# Patient Record
Sex: MALE | Race: WHITE | NOT HISPANIC OR LATINO | URBAN - METROPOLITAN AREA
[De-identification: names, ages, dates, MRNs, and addresses within clinical notes are randomized per-mention and may not be internally consistent; named-entity substitution may affect disease eponyms.]

---

## 2021-07-08 ENCOUNTER — INPATIENT HOSPITAL (OUTPATIENT)
Dept: URBAN - METROPOLITAN AREA MEDICAL CENTER 11 | Facility: MEDICAL CENTER | Age: 56
End: 2021-07-08
Payer: COMMERCIAL

## 2021-07-08 DIAGNOSIS — K22.6 GASTRO-ESOPHAGEAL LACERATION-HEMORRHAGE SYNDROME: ICD-10-CM

## 2021-07-08 DIAGNOSIS — R74.8 ABNORMAL LEVELS OF OTHER SERUM ENZYMES: ICD-10-CM

## 2021-07-08 DIAGNOSIS — K92.0 HEMATEMESIS: ICD-10-CM

## 2021-07-08 DIAGNOSIS — D69.6 THROMBOCYTOPENIA, UNSPECIFIED: ICD-10-CM

## 2021-07-08 DIAGNOSIS — Z98.84 BARIATRIC SURGERY STATUS: ICD-10-CM

## 2021-07-08 DIAGNOSIS — D62 ACUTE POSTHEMORRHAGIC ANEMIA: ICD-10-CM

## 2021-07-08 DIAGNOSIS — K20.0 EOSINOPHILIC ESOPHAGITIS: ICD-10-CM

## 2021-07-08 PROCEDURE — 43235 EGD DIAGNOSTIC BRUSH WASH: CPT | Performed by: SPECIALIST

## 2021-07-08 PROCEDURE — 99222 1ST HOSP IP/OBS MODERATE 55: CPT | Mod: 25 | Performed by: SPECIALIST

## 2021-07-09 ENCOUNTER — INPATIENT HOSPITAL (OUTPATIENT)
Dept: URBAN - METROPOLITAN AREA MEDICAL CENTER 11 | Facility: MEDICAL CENTER | Age: 56
End: 2021-07-09
Payer: COMMERCIAL

## 2021-07-09 DIAGNOSIS — K70.10 ALCOHOLIC HEPATITIS WITHOUT ASCITES: ICD-10-CM

## 2021-07-09 DIAGNOSIS — Z98.84 BARIATRIC SURGERY STATUS: ICD-10-CM

## 2021-07-09 DIAGNOSIS — R74.8 ABNORMAL LEVELS OF OTHER SERUM ENZYMES: ICD-10-CM

## 2021-07-09 DIAGNOSIS — K20.0 EOSINOPHILIC ESOPHAGITIS: ICD-10-CM

## 2021-07-09 DIAGNOSIS — K22.6 GASTRO-ESOPHAGEAL LACERATION-HEMORRHAGE SYNDROME: ICD-10-CM

## 2021-07-09 DIAGNOSIS — D62 ACUTE POSTHEMORRHAGIC ANEMIA: ICD-10-CM

## 2021-07-09 DIAGNOSIS — K92.0 HEMATEMESIS: ICD-10-CM

## 2021-07-09 DIAGNOSIS — D69.6 THROMBOCYTOPENIA, UNSPECIFIED: ICD-10-CM

## 2021-07-09 PROCEDURE — 99232 SBSQ HOSP IP/OBS MODERATE 35: CPT | Performed by: INTERNAL MEDICINE

## 2021-07-10 ENCOUNTER — INPATIENT HOSPITAL (OUTPATIENT)
Dept: URBAN - METROPOLITAN AREA MEDICAL CENTER 11 | Facility: MEDICAL CENTER | Age: 56
End: 2021-07-10
Payer: COMMERCIAL

## 2021-07-10 DIAGNOSIS — K92.0 HEMATEMESIS: ICD-10-CM

## 2021-07-10 DIAGNOSIS — D62 ACUTE POSTHEMORRHAGIC ANEMIA: ICD-10-CM

## 2021-07-10 DIAGNOSIS — K22.6 GASTRO-ESOPHAGEAL LACERATION-HEMORRHAGE SYNDROME: ICD-10-CM

## 2021-07-10 DIAGNOSIS — R74.8 ABNORMAL LEVELS OF OTHER SERUM ENZYMES: ICD-10-CM

## 2021-07-10 DIAGNOSIS — Z98.84 BARIATRIC SURGERY STATUS: ICD-10-CM

## 2021-07-10 DIAGNOSIS — D61.818 OTHER PANCYTOPENIA: ICD-10-CM

## 2021-07-10 DIAGNOSIS — D69.6 THROMBOCYTOPENIA, UNSPECIFIED: ICD-10-CM

## 2021-07-10 DIAGNOSIS — K20.0 EOSINOPHILIC ESOPHAGITIS: ICD-10-CM

## 2021-07-10 DIAGNOSIS — K70.10 ALCOHOLIC HEPATITIS WITHOUT ASCITES: ICD-10-CM

## 2021-07-10 PROCEDURE — 99232 SBSQ HOSP IP/OBS MODERATE 35: CPT | Performed by: INTERNAL MEDICINE

## 2021-08-05 ENCOUNTER — INPATIENT HOSPITAL (OUTPATIENT)
Dept: URBAN - METROPOLITAN AREA MEDICAL CENTER 11 | Facility: MEDICAL CENTER | Age: 56
End: 2021-08-05
Payer: COMMERCIAL

## 2021-08-05 DIAGNOSIS — K92.0 HEMATEMESIS: ICD-10-CM

## 2021-08-05 DIAGNOSIS — K22.10 ULCER OF ESOPHAGUS WITHOUT BLEEDING: ICD-10-CM

## 2021-08-05 DIAGNOSIS — K44.9 DIAPHRAGMATIC HERNIA WITHOUT OBSTRUCTION OR GANGRENE: ICD-10-CM

## 2021-08-05 DIAGNOSIS — D64.9 ANEMIA, UNSPECIFIED: ICD-10-CM

## 2021-08-05 PROCEDURE — 43235 EGD DIAGNOSTIC BRUSH WASH: CPT | Performed by: INTERNAL MEDICINE

## 2022-01-23 ENCOUNTER — INPATIENT HOSPITAL (OUTPATIENT)
Dept: URBAN - METROPOLITAN AREA MEDICAL CENTER 11 | Facility: MEDICAL CENTER | Age: 57
End: 2022-01-23

## 2022-01-23 DIAGNOSIS — D64.9 ANEMIA, UNSPECIFIED: ICD-10-CM

## 2022-01-23 DIAGNOSIS — K92.0 HEMATEMESIS: ICD-10-CM

## 2022-01-23 PROCEDURE — 99222 1ST HOSP IP/OBS MODERATE 55: CPT | Performed by: SPECIALIST

## 2022-01-24 ENCOUNTER — INPATIENT HOSPITAL (OUTPATIENT)
Dept: URBAN - METROPOLITAN AREA MEDICAL CENTER 11 | Facility: MEDICAL CENTER | Age: 57
End: 2022-01-24

## 2022-01-24 DIAGNOSIS — Z87.19 PERSONAL HISTORY OF OTHER DISEASES OF THE DIGESTIVE SYSTEM: ICD-10-CM

## 2022-01-24 DIAGNOSIS — Z98.84 BARIATRIC SURGERY STATUS: ICD-10-CM

## 2022-01-24 DIAGNOSIS — K92.0 HEMATEMESIS: ICD-10-CM

## 2022-01-24 DIAGNOSIS — R74.01 ELEVATION OF LEVELS OF LIVER TRANSAMINASE LEVELS: ICD-10-CM

## 2022-01-24 DIAGNOSIS — D61.818 OTHER PANCYTOPENIA: ICD-10-CM

## 2022-01-24 DIAGNOSIS — F10.19 ALCOHOL ABUSE WITH UNSPECIFIED ALCOHOL-INDUCED DISORDER: ICD-10-CM

## 2022-01-24 PROCEDURE — 99232 SBSQ HOSP IP/OBS MODERATE 35: CPT | Performed by: SPECIALIST

## 2022-01-25 ENCOUNTER — INPATIENT HOSPITAL (OUTPATIENT)
Dept: URBAN - METROPOLITAN AREA MEDICAL CENTER 11 | Facility: MEDICAL CENTER | Age: 57
End: 2022-01-25

## 2022-01-25 DIAGNOSIS — F10.19 ALCOHOL ABUSE WITH UNSPECIFIED ALCOHOL-INDUCED DISORDER: ICD-10-CM

## 2022-01-25 DIAGNOSIS — K92.0 HEMATEMESIS: ICD-10-CM

## 2022-01-25 DIAGNOSIS — Z98.84 BARIATRIC SURGERY STATUS: ICD-10-CM

## 2022-01-25 DIAGNOSIS — D61.818 OTHER PANCYTOPENIA: ICD-10-CM

## 2022-01-25 DIAGNOSIS — R74.01 ELEVATION OF LEVELS OF LIVER TRANSAMINASE LEVELS: ICD-10-CM

## 2022-01-25 DIAGNOSIS — Z87.19 PERSONAL HISTORY OF OTHER DISEASES OF THE DIGESTIVE SYSTEM: ICD-10-CM

## 2022-01-25 PROCEDURE — 99232 SBSQ HOSP IP/OBS MODERATE 35: CPT | Performed by: INTERNAL MEDICINE

## 2022-02-14 ENCOUNTER — OFFICE (OUTPATIENT)
Dept: URBAN - METROPOLITAN AREA CLINIC 67 | Facility: CLINIC | Age: 57
End: 2022-02-14

## 2022-02-14 VITALS
WEIGHT: 205 LBS | SYSTOLIC BLOOD PRESSURE: 122 MMHG | TEMPERATURE: 97.3 F | DIASTOLIC BLOOD PRESSURE: 68 MMHG | HEART RATE: 110 BPM | OXYGEN SATURATION: 98 % | HEIGHT: 72 IN

## 2022-02-14 DIAGNOSIS — K76.6 PORTAL HYPERTENSION: ICD-10-CM

## 2022-02-14 DIAGNOSIS — F10.20 ALCOHOL DEPENDENCE, UNCOMPLICATED: ICD-10-CM

## 2022-02-14 DIAGNOSIS — D50.0 IRON DEFICIENCY ANEMIA SECONDARY TO BLOOD LOSS (CHRONIC): ICD-10-CM

## 2022-02-14 DIAGNOSIS — Z12.11 ENCOUNTER FOR SCREENING FOR MALIGNANT NEOPLASM OF COLON: ICD-10-CM

## 2022-02-14 PROCEDURE — 99214 OFFICE O/P EST MOD 30 MIN: CPT | Performed by: INTERNAL MEDICINE

## 2022-02-14 RX ORDER — SODIUM SULFATE, MAGNESIUM SULFATE, AND POTASSIUM CHLORIDE 17.75; 2.7; 2.25 G/1; G/1; G/1
TABLET ORAL
Qty: 1 | Refills: 0 | Status: COMPLETED
Start: 2022-02-14 | End: 2022-06-28

## 2022-02-14 NOTE — SERVICENOTES
I applauded his efforts at alcohol cessation - he has not had any further episodes of coffee-ground emesis since his discharge from [facility]. I will check the above blood work and schedule him for a repeat screening colonoscopy with Dr. Do - we will plan for a FibroScan at the same time to assess for advanced fibrosis/cirrhosis.

## 2022-02-14 NOTE — SERVICEHPINOTES
The patient is seen today for follow-up after a recent hospitalization. He presented to the [facility] on 11/16/21 due to left upper quadrant abdominal pain and nausea. Labs: CMP-potassium 3.2, magnesium 1.5, , ALT 63, normal lipase, CBC-WBC 3.9, RBC 3.74, Hgb 10.9, HCT 34.3 with normal differential. Plasma serum alcohol 142 (H). normal chest x-ray. CT abdomen and pelvis with contrast revealed postsurgical changes. Diverticulosis without diverticulitis. Fatty infiltration of the liver. Magnesium was given in the ER and patient was discharged on potassium and pantoprazole.Today, he is seen for follow-up after a recent hospitalization - he was hospitalized at [facility] last month secondary to alcohol withdrawal and coffee ground emesis. 
br He reports that since his discharge, he has not had any further episodes of coffee-ground emesis and he denies any abdominal pain. He has been abstinent from further consumption of alcohol. His CBC done on 1/27/22 demonstrated a wbc count 3.4, Hgb 10.6 and plt count 65. Qcstoijft47/16/21- contrasted CT abdomen and pelvis- revealed postsurgical changes. Diverticulosis without diverticulitis. Fatty infiltration of the liver. br7/9/21 abdominal ultrasound-fatty liver. Normal spleen and no ascitesProcedures:br8/5/21 EGD (me) while hospitalized at [facility] secondary to hematemesis-revealed changes of a nonbleeding erosive esophagitis at the GE junction. Small hiatal hernia versus previous lap band placement. Suspect changes of mild portal hypertensive gastropathy within the gastric body. Normal duodenum. No fresh or old blood seen in the upper GI tract or evidence of varices.br7/18/21 EGD with Dr. Melissa-in the hospital secondary to hematemesis-revealed probable eosinophilic esophagitis. Helen-Wick tear ×2. No evidence of gastric bypass, but question of partial fundoplication or laparoscopic band. Old blood in the stomach and duodenum.br6/22/09 colonoscopy with Dr. Do was normal - no known family history of CRC or polypsbr

## 2022-03-31 ENCOUNTER — INPATIENT HOSPITAL (OUTPATIENT)
Dept: URBAN - METROPOLITAN AREA MEDICAL CENTER 11 | Facility: MEDICAL CENTER | Age: 57
End: 2022-03-31
Payer: COMMERCIAL

## 2022-03-31 DIAGNOSIS — K92.0 HEMATEMESIS: ICD-10-CM

## 2022-03-31 DIAGNOSIS — K20.90 ESOPHAGITIS, UNSPECIFIED WITHOUT BLEEDING: ICD-10-CM

## 2022-03-31 PROCEDURE — 99232 SBSQ HOSP IP/OBS MODERATE 35: CPT | Performed by: SPECIALIST

## 2022-06-28 ENCOUNTER — OFFICE (OUTPATIENT)
Dept: URBAN - METROPOLITAN AREA CLINIC 67 | Facility: CLINIC | Age: 57
End: 2022-06-28

## 2022-06-28 VITALS
SYSTOLIC BLOOD PRESSURE: 150 MMHG | DIASTOLIC BLOOD PRESSURE: 86 MMHG | HEIGHT: 72 IN | HEART RATE: 90 BPM | WEIGHT: 197 LBS

## 2022-06-28 DIAGNOSIS — Z12.11 ENCOUNTER FOR SCREENING FOR MALIGNANT NEOPLASM OF COLON: ICD-10-CM

## 2022-06-28 DIAGNOSIS — F10.20 ALCOHOL DEPENDENCE, UNCOMPLICATED: ICD-10-CM

## 2022-06-28 DIAGNOSIS — R18.8 OTHER ASCITES: ICD-10-CM

## 2022-06-28 DIAGNOSIS — K70.30 ALCOHOLIC CIRRHOSIS OF LIVER WITHOUT ASCITES: ICD-10-CM

## 2022-06-28 DIAGNOSIS — D69.6 THROMBOCYTOPENIA, UNSPECIFIED: ICD-10-CM

## 2022-06-28 PROCEDURE — 99214 OFFICE O/P EST MOD 30 MIN: CPT

## 2022-06-28 RX ORDER — RIFAXIMIN 550 MG/1
1100 TABLET ORAL
Qty: 60 | Refills: 5 | Status: ACTIVE
Start: 2022-06-28

## 2022-07-12 ENCOUNTER — OFFICE (OUTPATIENT)
Dept: URBAN - METROPOLITAN AREA CLINIC 67 | Facility: CLINIC | Age: 57
End: 2022-07-12
Payer: COMMERCIAL

## 2022-07-12 DIAGNOSIS — K76.0 FATTY (CHANGE OF) LIVER, NOT ELSEWHERE CLASSIFIED: ICD-10-CM

## 2022-07-12 PROCEDURE — 91200 LIVER ELASTOGRAPHY: CPT

## 2022-07-20 ENCOUNTER — INPATIENT HOSPITAL (OUTPATIENT)
Dept: URBAN - METROPOLITAN AREA MEDICAL CENTER 11 | Facility: MEDICAL CENTER | Age: 57
End: 2022-07-20
Payer: COMMERCIAL

## 2022-07-20 DIAGNOSIS — K82.8 OTHER SPECIFIED DISEASES OF GALLBLADDER: ICD-10-CM

## 2022-07-20 DIAGNOSIS — K70.11 ALCOHOLIC HEPATITIS WITH ASCITES: ICD-10-CM

## 2022-07-20 DIAGNOSIS — K70.30 ALCOHOLIC CIRRHOSIS OF LIVER WITHOUT ASCITES: ICD-10-CM

## 2022-07-20 DIAGNOSIS — R18.8 OTHER ASCITES: ICD-10-CM

## 2022-07-20 PROCEDURE — 99221 1ST HOSP IP/OBS SF/LOW 40: CPT | Performed by: INTERNAL MEDICINE

## 2022-08-12 ENCOUNTER — AMBULATORY SURGICAL CENTER (OUTPATIENT)
Dept: URBAN - METROPOLITAN AREA SURGERY 19 | Facility: SURGERY | Age: 57
End: 2022-08-12
Payer: MEDICARE

## 2022-08-12 DIAGNOSIS — K64.8 OTHER HEMORRHOIDS: ICD-10-CM

## 2022-08-12 DIAGNOSIS — K57.30 DIVERTICULOSIS OF LARGE INTESTINE WITHOUT PERFORATION OR ABS: ICD-10-CM

## 2022-08-12 DIAGNOSIS — Z12.11 ENCOUNTER FOR SCREENING FOR MALIGNANT NEOPLASM OF COLON: ICD-10-CM

## 2022-08-12 LAB
COLONOSCOPY STUDY: (no result)
COLONOSCOPY STUDY: (no result)

## 2022-08-12 PROCEDURE — G0121 COLON CA SCRN NOT HI RSK IND: HCPCS | Performed by: SPECIALIST

## 2022-09-06 ENCOUNTER — OFFICE (OUTPATIENT)
Dept: URBAN - METROPOLITAN AREA CLINIC 67 | Facility: CLINIC | Age: 57
End: 2022-09-06

## 2022-09-06 VITALS
HEIGHT: 72 IN | HEART RATE: 98 BPM | OXYGEN SATURATION: 97 % | DIASTOLIC BLOOD PRESSURE: 70 MMHG | SYSTOLIC BLOOD PRESSURE: 112 MMHG | WEIGHT: 185 LBS

## 2022-09-06 DIAGNOSIS — K70.31 ALCOHOLIC CIRRHOSIS OF LIVER WITH ASCITES: ICD-10-CM

## 2022-09-06 DIAGNOSIS — K72.90 HEPATIC FAILURE, UNSPECIFIED WITHOUT COMA: ICD-10-CM

## 2022-09-06 DIAGNOSIS — F10.20 ALCOHOL DEPENDENCE, UNCOMPLICATED: ICD-10-CM

## 2022-09-06 PROCEDURE — 99214 OFFICE O/P EST MOD 30 MIN: CPT

## 2022-09-06 RX ORDER — SPIRONOLACTONE 100 MG/1
TABLET ORAL
Qty: 138 | Refills: 0 | Status: ACTIVE

## 2022-09-06 RX ORDER — FUROSEMIDE 40 MG/1
80 TABLET ORAL
Qty: 30 | Refills: 5 | Status: ACTIVE

## 2022-09-06 RX ORDER — LACTULOSE 10 G/15ML
SOLUTION ORAL; RECTAL
Qty: 1800 | Refills: 10 | Status: ACTIVE
Start: 2022-09-06
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
## 2022-10-06 ENCOUNTER — OFFICE (OUTPATIENT)
Dept: URBAN - METROPOLITAN AREA CLINIC 67 | Facility: CLINIC | Age: 57
End: 2022-10-06

## 2022-10-06 VITALS
WEIGHT: 191 LBS | HEART RATE: 70 BPM | HEIGHT: 72 IN | DIASTOLIC BLOOD PRESSURE: 66 MMHG | SYSTOLIC BLOOD PRESSURE: 108 MMHG

## 2022-10-06 DIAGNOSIS — K72.90 HEPATIC FAILURE, UNSPECIFIED WITHOUT COMA: ICD-10-CM

## 2022-10-06 DIAGNOSIS — K70.31 ALCOHOLIC CIRRHOSIS OF LIVER WITH ASCITES: ICD-10-CM

## 2022-10-06 DIAGNOSIS — K76.6 PORTAL HYPERTENSION: ICD-10-CM

## 2022-10-06 PROCEDURE — 99214 OFFICE O/P EST MOD 30 MIN: CPT

## 2022-10-06 RX ORDER — SPIRONOLACTONE 100 MG/1
TABLET ORAL
Qty: 138 | Refills: 0 | Status: ACTIVE

## 2022-10-06 RX ORDER — POTASSIUM CHLORIDE 20 MEQ/1
20 TABLET, EXTENDED RELEASE ORAL
Refills: 0 | Status: COMPLETED
End: 2022-10-06

## 2022-10-06 NOTE — SERVICENOTES
1. Continue xifaxan twice a day
2. Continue low sodium diet, under 2,000mg
3. increase spironolactone to 100mg (okay to take 2- 50mg tablets)
4. STOP potassium
5. Continue furosemide 40mg daily. 
6. Continue lactulose twice a day. 
7. Continue 2 stool softeners daily. 
8. Start psyllium husk(fiber) daily. 1 time a day. 
9. Referral to [facility], Dr Rdz. 
10. Continue with paracentesis every 2 weeks.
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
## 2022-12-06 ENCOUNTER — OFFICE (OUTPATIENT)
Dept: URBAN - METROPOLITAN AREA CLINIC 67 | Facility: CLINIC | Age: 57
End: 2022-12-06

## 2022-12-06 VITALS
SYSTOLIC BLOOD PRESSURE: 100 MMHG | HEART RATE: 60 BPM | WEIGHT: 174 LBS | OXYGEN SATURATION: 99 % | HEIGHT: 72 IN | DIASTOLIC BLOOD PRESSURE: 62 MMHG

## 2022-12-06 DIAGNOSIS — K72.90 HEPATIC FAILURE, UNSPECIFIED WITHOUT COMA: ICD-10-CM

## 2022-12-06 DIAGNOSIS — K70.31 ALCOHOLIC CIRRHOSIS OF LIVER WITH ASCITES: ICD-10-CM

## 2022-12-06 DIAGNOSIS — F10.20 ALCOHOL DEPENDENCE, UNCOMPLICATED: ICD-10-CM

## 2022-12-06 PROCEDURE — 99214 OFFICE O/P EST MOD 30 MIN: CPT
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
## 2022-12-06 NOTE — SERVICENOTES
keep cardioligy appt
Continue current meds
Continue paracentesis every 10 dayss
Labs today to look at kidney function and potassium, will adjust dose then.

## 2023-11-14 ENCOUNTER — OFFICE (OUTPATIENT)
Dept: URBAN - METROPOLITAN AREA CLINIC 67 | Facility: CLINIC | Age: 58
End: 2023-11-14

## 2023-11-14 VITALS
SYSTOLIC BLOOD PRESSURE: 132 MMHG | OXYGEN SATURATION: 96 % | HEART RATE: 87 BPM | DIASTOLIC BLOOD PRESSURE: 80 MMHG | WEIGHT: 186 LBS | HEIGHT: 72 IN

## 2023-11-14 DIAGNOSIS — F10.20 ALCOHOL DEPENDENCE, UNCOMPLICATED: ICD-10-CM

## 2023-11-14 DIAGNOSIS — R18.8 OTHER ASCITES: ICD-10-CM

## 2023-11-14 DIAGNOSIS — K76.6 PORTAL HYPERTENSION: ICD-10-CM

## 2023-11-14 DIAGNOSIS — K70.30 ALCOHOLIC CIRRHOSIS OF LIVER WITHOUT ASCITES: ICD-10-CM

## 2023-11-14 PROCEDURE — 99204 OFFICE O/P NEW MOD 45 MIN: CPT | Performed by: SPECIALIST

## 2023-12-12 ENCOUNTER — OFFICE (OUTPATIENT)
Dept: URBAN - METROPOLITAN AREA CLINIC 67 | Facility: CLINIC | Age: 58
End: 2023-12-12

## 2023-12-12 VITALS
WEIGHT: 173 LBS | HEART RATE: 92 BPM | DIASTOLIC BLOOD PRESSURE: 70 MMHG | SYSTOLIC BLOOD PRESSURE: 118 MMHG | HEIGHT: 72 IN

## 2023-12-12 DIAGNOSIS — K72.90 HEPATIC FAILURE, UNSPECIFIED WITHOUT COMA: ICD-10-CM

## 2023-12-12 DIAGNOSIS — F10.20 ALCOHOL DEPENDENCE, UNCOMPLICATED: ICD-10-CM

## 2023-12-12 DIAGNOSIS — K70.30 ALCOHOLIC CIRRHOSIS OF LIVER WITHOUT ASCITES: ICD-10-CM

## 2023-12-12 DIAGNOSIS — D50.0 IRON DEFICIENCY ANEMIA SECONDARY TO BLOOD LOSS (CHRONIC): ICD-10-CM

## 2023-12-12 DIAGNOSIS — R18.8 OTHER ASCITES: ICD-10-CM

## 2023-12-12 PROCEDURE — 99214 OFFICE O/P EST MOD 30 MIN: CPT

## 2023-12-12 RX ORDER — SPIRONOLACTONE 25 MG/1
TABLET, FILM COATED ORAL
Qty: 30 | Refills: 2 | Status: COMPLETED
End: 2024-08-15

## 2023-12-12 RX ORDER — FUROSEMIDE 20 MG/1
20 TABLET ORAL
Qty: 30 | Refills: 2 | Status: COMPLETED
Start: 2023-12-12 | End: 2024-08-15

## 2023-12-12 NOTE — SERVICENOTES
1. Okay to continue Xifaxan but confirm preference with [facility] tomorrow
2. Start iron 65mg daily (optional Vitamin C supplement)
3. Continue pantoprazole for stomach acid/reflux
4. Start sucralfate twice a day, morning and night to help with the nausea and vomiting with eating too fast. Okay to take sucralfate up to 4 times a day, before meals. 
5. Continue ETOH cessation
6. Watch blood pressure and heart rate at home. If dizziness occurs, BP drops below 90/60 or heart rate goes over 100, please call the office. 
Taking the diuretics (spironolactone and furosemide) will help with keeping fluid off of your abdomen but can cause LOW blood pressure or increased heart rate or dizziness. 
7. Follow up 4-6 weeks
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
## 2024-01-18 ENCOUNTER — OFFICE (OUTPATIENT)
Dept: URBAN - METROPOLITAN AREA CLINIC 67 | Facility: CLINIC | Age: 59
End: 2024-01-18

## 2024-01-18 VITALS — WEIGHT: 194 LBS | DIASTOLIC BLOOD PRESSURE: 70 MMHG | HEIGHT: 72 IN | SYSTOLIC BLOOD PRESSURE: 110 MMHG

## 2024-01-18 DIAGNOSIS — K70.30 ALCOHOLIC CIRRHOSIS OF LIVER WITHOUT ASCITES: ICD-10-CM

## 2024-01-18 DIAGNOSIS — K72.90 HEPATIC FAILURE, UNSPECIFIED WITHOUT COMA: ICD-10-CM

## 2024-01-18 DIAGNOSIS — L29.9 PRURITUS, UNSPECIFIED: ICD-10-CM

## 2024-01-18 DIAGNOSIS — K76.6 PORTAL HYPERTENSION: ICD-10-CM

## 2024-01-18 DIAGNOSIS — R18.8 OTHER ASCITES: ICD-10-CM

## 2024-01-18 DIAGNOSIS — F10.20 ALCOHOL DEPENDENCE, UNCOMPLICATED: ICD-10-CM

## 2024-01-18 PROCEDURE — 99214 OFFICE O/P EST MOD 30 MIN: CPT

## 2024-01-18 RX ORDER — PANTOPRAZOLE SODIUM 40 MG/1
40 TABLET, DELAYED RELEASE ORAL
Qty: 90 | Refills: 3 | Status: COMPLETED
Start: 2024-01-18 | End: 2024-08-15

## 2024-01-18 RX ORDER — MECLIZINE HYDROCHLORIDE 12.5 MG/1
TABLET ORAL
Qty: 90 | Refills: 1 | Status: ACTIVE
Start: 2024-01-18

## 2024-08-15 ENCOUNTER — OFFICE (OUTPATIENT)
Dept: URBAN - METROPOLITAN AREA CLINIC 67 | Facility: CLINIC | Age: 59
End: 2024-08-15
Payer: MEDICARE

## 2024-08-15 VITALS
DIASTOLIC BLOOD PRESSURE: 80 MMHG | WEIGHT: 237 LBS | HEIGHT: 72 IN | HEART RATE: 92 BPM | SYSTOLIC BLOOD PRESSURE: 122 MMHG | OXYGEN SATURATION: 99 %

## 2024-08-15 DIAGNOSIS — K21.00 GASTRO-ESOPHAGEAL REFLUX DISEASE WITH ESOPHAGITIS, WITHOUT B: ICD-10-CM

## 2024-08-15 DIAGNOSIS — G47.9 SLEEP DISORDER, UNSPECIFIED: ICD-10-CM

## 2024-08-15 DIAGNOSIS — N62 HYPERTROPHY OF BREAST: ICD-10-CM

## 2024-08-15 DIAGNOSIS — R18.8 OTHER ASCITES: ICD-10-CM

## 2024-08-15 DIAGNOSIS — F10.20 ALCOHOL DEPENDENCE, UNCOMPLICATED: ICD-10-CM

## 2024-08-15 DIAGNOSIS — K70.30 ALCOHOLIC CIRRHOSIS OF LIVER WITHOUT ASCITES: ICD-10-CM

## 2024-08-15 DIAGNOSIS — K72.90 HEPATIC FAILURE, UNSPECIFIED WITHOUT COMA: ICD-10-CM

## 2024-08-15 DIAGNOSIS — L29.9 PRURITUS, UNSPECIFIED: ICD-10-CM

## 2024-08-15 DIAGNOSIS — K76.6 PORTAL HYPERTENSION: ICD-10-CM

## 2024-08-15 PROCEDURE — 99214 OFFICE O/P EST MOD 30 MIN: CPT

## 2024-08-15 RX ORDER — FUROSEMIDE 20 MG/1
20 TABLET ORAL
Qty: 30 | Refills: 2 | Status: COMPLETED
Start: 2023-12-12 | End: 2024-08-15

## 2024-08-15 RX ORDER — MECLIZINE HYDROCHLORIDE 12.5 MG/1
TABLET ORAL
Qty: 90 | Refills: 1 | Status: ACTIVE
Start: 2024-01-18

## 2024-11-30 ENCOUNTER — OFFICE (OUTPATIENT)
Dept: URBAN - METROPOLITAN AREA CLINIC 67 | Facility: CLINIC | Age: 59
End: 2024-11-30
Payer: MEDICARE

## 2024-11-30 DIAGNOSIS — K72.90 HEPATIC FAILURE, UNSPECIFIED WITHOUT COMA: ICD-10-CM

## 2024-11-30 DIAGNOSIS — K70.30 ALCOHOLIC CIRRHOSIS OF LIVER WITHOUT ASCITES: ICD-10-CM

## 2024-11-30 DIAGNOSIS — K76.6 PORTAL HYPERTENSION: ICD-10-CM

## 2024-11-30 DIAGNOSIS — F10.20 ALCOHOL DEPENDENCE, UNCOMPLICATED: ICD-10-CM

## 2024-11-30 PROCEDURE — 99490 CHRNC CARE MGMT STAFF 1ST 20: CPT

## 2024-11-30 PROCEDURE — 99439 CHRNC CARE MGMT STAF EA ADDL: CPT

## 2024-12-31 ENCOUNTER — OFFICE (OUTPATIENT)
Dept: URBAN - METROPOLITAN AREA CLINIC 67 | Facility: CLINIC | Age: 59
End: 2024-12-31
Payer: MEDICARE

## 2024-12-31 DIAGNOSIS — K72.90 HEPATIC FAILURE, UNSPECIFIED WITHOUT COMA: ICD-10-CM

## 2024-12-31 DIAGNOSIS — K76.6 PORTAL HYPERTENSION: ICD-10-CM

## 2024-12-31 DIAGNOSIS — F10.20 ALCOHOL DEPENDENCE, UNCOMPLICATED: ICD-10-CM

## 2024-12-31 DIAGNOSIS — K70.30 ALCOHOLIC CIRRHOSIS OF LIVER WITHOUT ASCITES: ICD-10-CM

## 2024-12-31 PROCEDURE — 99490 CHRNC CARE MGMT STAFF 1ST 20: CPT

## 2025-02-28 ENCOUNTER — OFFICE (OUTPATIENT)
Dept: URBAN - METROPOLITAN AREA CLINIC 67 | Facility: CLINIC | Age: 60
End: 2025-02-28
Payer: MEDICARE

## 2025-02-28 DIAGNOSIS — K72.90 HEPATIC FAILURE, UNSPECIFIED WITHOUT COMA: ICD-10-CM

## 2025-02-28 DIAGNOSIS — K76.6 PORTAL HYPERTENSION: ICD-10-CM

## 2025-02-28 DIAGNOSIS — F10.20 ALCOHOL DEPENDENCE, UNCOMPLICATED: ICD-10-CM

## 2025-02-28 DIAGNOSIS — K70.30 ALCOHOLIC CIRRHOSIS OF LIVER WITHOUT ASCITES: ICD-10-CM

## 2025-02-28 PROCEDURE — 99490 CHRNC CARE MGMT STAFF 1ST 20: CPT

## 2025-03-31 ENCOUNTER — OFFICE (OUTPATIENT)
Dept: URBAN - METROPOLITAN AREA CLINIC 67 | Facility: CLINIC | Age: 60
End: 2025-03-31
Payer: MEDICARE

## 2025-03-31 DIAGNOSIS — K70.30 ALCOHOLIC CIRRHOSIS OF LIVER WITHOUT ASCITES: ICD-10-CM

## 2025-03-31 DIAGNOSIS — K76.6 PORTAL HYPERTENSION: ICD-10-CM

## 2025-03-31 DIAGNOSIS — K72.90 HEPATIC FAILURE, UNSPECIFIED WITHOUT COMA: ICD-10-CM

## 2025-03-31 DIAGNOSIS — F10.20 ALCOHOL DEPENDENCE, UNCOMPLICATED: ICD-10-CM

## 2025-03-31 PROCEDURE — 99490 CHRNC CARE MGMT STAFF 1ST 20: CPT

## 2025-05-31 ENCOUNTER — OFFICE (OUTPATIENT)
Dept: URBAN - METROPOLITAN AREA CLINIC 67 | Facility: CLINIC | Age: 60
End: 2025-05-31
Payer: MEDICARE

## 2025-05-31 DIAGNOSIS — K72.90 HEPATIC FAILURE, UNSPECIFIED WITHOUT COMA: ICD-10-CM

## 2025-05-31 DIAGNOSIS — F10.20 ALCOHOL DEPENDENCE, UNCOMPLICATED: ICD-10-CM

## 2025-05-31 DIAGNOSIS — K70.30 ALCOHOLIC CIRRHOSIS OF LIVER WITHOUT ASCITES: ICD-10-CM

## 2025-05-31 DIAGNOSIS — K76.6 PORTAL HYPERTENSION: ICD-10-CM

## 2025-05-31 PROCEDURE — 99490 CHRNC CARE MGMT STAFF 1ST 20: CPT
